# Patient Record
Sex: MALE | Race: BLACK OR AFRICAN AMERICAN | NOT HISPANIC OR LATINO | Employment: UNEMPLOYED | URBAN - METROPOLITAN AREA
[De-identification: names, ages, dates, MRNs, and addresses within clinical notes are randomized per-mention and may not be internally consistent; named-entity substitution may affect disease eponyms.]

---

## 2017-09-11 ENCOUNTER — APPOINTMENT (OUTPATIENT)
Dept: RADIOLOGY | Facility: MEDICAL CENTER | Age: 22
End: 2017-09-11
Attending: EMERGENCY MEDICINE
Payer: COMMERCIAL

## 2017-09-11 ENCOUNTER — HOSPITAL ENCOUNTER (EMERGENCY)
Facility: MEDICAL CENTER | Age: 22
End: 2017-09-11
Attending: EMERGENCY MEDICINE
Payer: COMMERCIAL

## 2017-09-11 VITALS
OXYGEN SATURATION: 97 % | HEART RATE: 76 BPM | TEMPERATURE: 98.7 F | DIASTOLIC BLOOD PRESSURE: 69 MMHG | SYSTOLIC BLOOD PRESSURE: 146 MMHG | WEIGHT: 189.6 LBS | RESPIRATION RATE: 14 BRPM | BODY MASS INDEX: 25.13 KG/M2 | HEIGHT: 73 IN

## 2017-09-11 DIAGNOSIS — R10.9 ACUTE ABDOMINAL PAIN: ICD-10-CM

## 2017-09-11 LAB
ALBUMIN SERPL BCP-MCNC: 5.2 G/DL (ref 3.2–4.9)
ALBUMIN/GLOB SERPL: 1.5 G/DL
ALP SERPL-CCNC: 92 U/L (ref 30–99)
ALT SERPL-CCNC: 22 U/L (ref 2–50)
ANION GAP SERPL CALC-SCNC: 13 MMOL/L (ref 0–11.9)
AST SERPL-CCNC: 24 U/L (ref 12–45)
BASOPHILS # BLD AUTO: 0.4 % (ref 0–1.8)
BASOPHILS # BLD: 0.05 K/UL (ref 0–0.12)
BILIRUB SERPL-MCNC: 1.4 MG/DL (ref 0.1–1.5)
BUN SERPL-MCNC: 15 MG/DL (ref 8–22)
CALCIUM SERPL-MCNC: 10.6 MG/DL (ref 8.5–10.5)
CHLORIDE SERPL-SCNC: 102 MMOL/L (ref 96–112)
CO2 SERPL-SCNC: 23 MMOL/L (ref 20–33)
CREAT SERPL-MCNC: 1.11 MG/DL (ref 0.5–1.4)
EOSINOPHIL # BLD AUTO: 0.03 K/UL (ref 0–0.51)
EOSINOPHIL NFR BLD: 0.2 % (ref 0–6.9)
ERYTHROCYTE [DISTWIDTH] IN BLOOD BY AUTOMATED COUNT: 38.2 FL (ref 35.9–50)
GFR SERPL CREATININE-BSD FRML MDRD: >60 ML/MIN/1.73 M 2
GLOBULIN SER CALC-MCNC: 3.4 G/DL (ref 1.9–3.5)
GLUCOSE SERPL-MCNC: 103 MG/DL (ref 65–99)
HCT VFR BLD AUTO: 46.8 % (ref 42–52)
HGB BLD-MCNC: 16.7 G/DL (ref 14–18)
IMM GRANULOCYTES # BLD AUTO: 0.02 K/UL (ref 0–0.11)
IMM GRANULOCYTES NFR BLD AUTO: 0.2 % (ref 0–0.9)
LIPASE SERPL-CCNC: <3 U/L (ref 11–82)
LYMPHOCYTES # BLD AUTO: 1.25 K/UL (ref 1–4.8)
LYMPHOCYTES NFR BLD: 10.4 % (ref 22–41)
MCH RBC QN AUTO: 30.9 PG (ref 27–33)
MCHC RBC AUTO-ENTMCNC: 35.7 G/DL (ref 33.7–35.3)
MCV RBC AUTO: 86.5 FL (ref 81.4–97.8)
MONOCYTES # BLD AUTO: 0.89 K/UL (ref 0–0.85)
MONOCYTES NFR BLD AUTO: 7.4 % (ref 0–13.4)
NEUTROPHILS # BLD AUTO: 9.82 K/UL (ref 1.82–7.42)
NEUTROPHILS NFR BLD: 81.4 % (ref 44–72)
NRBC # BLD AUTO: 0 K/UL
NRBC BLD AUTO-RTO: 0 /100 WBC
PLATELET # BLD AUTO: 161 K/UL (ref 164–446)
PMV BLD AUTO: 10.7 FL (ref 9–12.9)
POTASSIUM SERPL-SCNC: 4 MMOL/L (ref 3.6–5.5)
PROT SERPL-MCNC: 8.6 G/DL (ref 6–8.2)
RBC # BLD AUTO: 5.41 M/UL (ref 4.7–6.1)
SODIUM SERPL-SCNC: 138 MMOL/L (ref 135–145)
WBC # BLD AUTO: 12.1 K/UL (ref 4.8–10.8)

## 2017-09-11 PROCEDURE — 83690 ASSAY OF LIPASE: CPT

## 2017-09-11 PROCEDURE — 99284 EMERGENCY DEPT VISIT MOD MDM: CPT

## 2017-09-11 PROCEDURE — 80053 COMPREHEN METABOLIC PANEL: CPT

## 2017-09-11 PROCEDURE — 74177 CT ABD & PELVIS W/CONTRAST: CPT

## 2017-09-11 PROCEDURE — 81003 URINALYSIS AUTO W/O SCOPE: CPT

## 2017-09-11 PROCEDURE — 700117 HCHG RX CONTRAST REV CODE 255: Performed by: EMERGENCY MEDICINE

## 2017-09-11 PROCEDURE — 85025 COMPLETE CBC W/AUTO DIFF WBC: CPT

## 2017-09-11 PROCEDURE — 36415 COLL VENOUS BLD VENIPUNCTURE: CPT

## 2017-09-11 RX ADMIN — IOHEXOL 100 ML: 350 INJECTION, SOLUTION INTRAVENOUS at 21:40

## 2017-09-11 ASSESSMENT — LIFESTYLE VARIABLES: DO YOU DRINK ALCOHOL: NO

## 2017-09-12 LAB
APPEARANCE UR: CLEAR
BILIRUB UR QL STRIP.AUTO: NEGATIVE
COLOR UR: YELLOW
GLUCOSE UR STRIP.AUTO-MCNC: NEGATIVE MG/DL
KETONES UR STRIP.AUTO-MCNC: ABNORMAL MG/DL
LEUKOCYTE ESTERASE UR QL STRIP.AUTO: NEGATIVE
MICRO URNS: ABNORMAL
NITRITE UR QL STRIP.AUTO: NEGATIVE
PH UR STRIP.AUTO: 6 [PH]
PROT UR QL STRIP: NEGATIVE MG/DL
RBC UR QL AUTO: NEGATIVE
SP GR UR STRIP.AUTO: 1
UROBILINOGEN UR STRIP.AUTO-MCNC: NORMAL MG/DL

## 2017-09-12 NOTE — ED PROVIDER NOTES
ED Provider Note    HPI: Patient is a 22-year-old male who presented to the emergency department September 11, 2017 at 5:53 PM with a chief complaint of right-sided abdominal pain.    Patient was seen in urgent care today. They were concerned about a possible appendix. The patient was sent here for further care. Symptoms began earlier in the day. The patient has had pain in both lower quadrants of the abdomen somewhat greater on the right. He's had some nausea and vomiting but no diarrhea. By the time the patient was brought back to the department.  He felt quite a bit better. No fever no chills no cough. The pain was not positional in nature. No change in bladder or bowel habits. Nothing in particular seemed to make the pain better or worse.    Review of Systems: Positive for lower quadrant abdominal pain greater on the right that seems somewhat better than earlier positive for nausea vomiting negative for fever chills cough change in bladder or bowel habits. 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  Neck: Trachea midline. No cervical masses seen or palpated. Normal range of motion, supple. No meningeal signs elicited.  Cardiac: Regular rate and rhythm. S1-S2 present. No S3 or S4 present. No murmurs, rubs, or gallops heard. No edema or varicosities were seen.   Lungs: Clear to auscultation with good aeration throughout. No wheezes, rales, or rhonchi heard. Patient's chest wall moved symmetrically with each respiratory effort. Patient was not making use of accessory  muscles of respiration in breathing.  Abdomen: Soft nontender to palpation. No rebound or guarding elicited. No organomegaly identified. Subjective tenderness in the right lower quadrant that is not increased with palpation. No pulsatile abdominal masses identified.   Musculoskeletal:  no  pain with palpitation or movement of muscle, bone or joint , no obvious musculoskeletal deformities identified.  Neurologic: alert and awake answers questions appropriately. Moves all four extremities independently, no gross focal abnormalities identified. Normal strength and motor.  Skin: no rash or lesion seen, no palpable dermatologic lesions identified.  Psychiatric: not anxious, delusional, or hallucinating.    Medical decision making: Laboratory studies were obtained (please see labsheet follow results) significant findings included white count 12.1 with no preponderance immature granulocytes. Chemistry panel is essentially unremarkable.    CT imaging of the abdomen with IV contrast obtained. No acute abnormalities were seen appendix was not visualized    Recheck showed the patient with a soft abdomen. I can elicit no tenderness with palpation. Had a long discussion with the patient about his laboratory and imaging studies. I made it very clear to him as well as encouraging is CT showed no obvious evidence of acute appendicitis this has not been completely ruled out. The patient is sent home with abdominal pain instructions. He is to rest and push fluids. It is not pain-free by tomorrow afternoon he is to return to ED for repeat exam and possible imaging    Patient verbalized understanding of these instructions including the possibility of early cold appendicitis and states he will comply.    Other possibilities and differential diagnosis would be kidney stone although this seems unlikely giving quality of his presenting complaints, viral type syndrome enteritis. Diverticulitis seems unlikely in a patient of this age with  unremarkable CT imaging findings.    Impression acute abdominal pain cause unclear

## 2017-09-12 NOTE — DISCHARGE INSTRUCTIONS
Abdominal Pain (Nonspecific)  Your exam might not show the exact reason you have abdominal pain. Since there are many different causes of abdominal pain, another checkup and more tests may be needed. It is very important to follow up for lasting (persistent) or worsening symptoms. A possible cause of abdominal pain in any person who still has his or her appendix is acute appendicitis. Appendicitis is often hard to diagnose. Normal blood tests, urine tests, ultrasound, and CT scans do not completely rule out early appendicitis or other causes of abdominal pain. Sometimes, only the changes that happen over time will allow appendicitis and other causes of abdominal pain to be determined. Other potential problems that may require surgery may also take time to become more apparent. Because of this, it is important that you follow all of the instructions below.  HOME CARE INSTRUCTIONS   · Rest as much as possible.   · Do not eat solid food until your pain is gone.   · While adults or children have pain: A diet of water, weak decaffeinated tea, broth or bouillon, gelatin, oral rehydration solutions (ORS), frozen ice pops, or ice chips may be helpful.   · When pain is gone in adults or children: Start a light diet (dry toast, crackers, applesauce, or white rice). Increase the diet slowly as long as it does not bother you. Eat no dairy products (including cheese and eggs) and no spicy, fatty, fried, or high-fiber foods.   · Use no alcohol, caffeine, or cigarettes.   · Take your regular medicines unless your caregiver told you not to.   · Take any prescribed medicine as directed.   · Only take over-the-counter or prescription medicines for pain, discomfort, or fever as directed by your caregiver. Do not give aspirin to children.   If your caregiver has given you a follow-up appointment, it is very important to keep that appointment. Not keeping the appointment could result in a permanent injury and/or lasting (chronic) pain  and/or disability. If there is any problem keeping the appointment, you must call to reschedule.   SEEK IMMEDIATE MEDICAL CARE IF:   · Your pain is not gone in 24 hours.   · Your pain becomes worse, changes location, or feels different.   · You or your child has an oral temperature above 102° F (38.9° C), not controlled by medicine.   · Your baby is older than 3 months with a rectal temperature of 102° F (38.9° C) or higher.   · Your baby is 3 months old or younger with a rectal temperature of 100.4° F (38° C) or higher.   · You have shaking chills.   · You keep throwing up (vomiting) or cannot drink liquids.   · There is blood in your vomit or you see blood in your bowel movements.   · Your bowel movements become dark or black.   · You have frequent bowel movements.   · Your bowel movements stop (become blocked) or you cannot pass gas.   · You have bloody, frequent, or painful urination.   · You have yellow discoloration in the skin or whites of the eyes.   · Your stomach becomes bloated or bigger.   · You have dizziness or fainting.   · You have chest or back pain.   MAKE SURE YOU:   · Understand these instructions.   · Will watch your condition.   · Will get help right away if you are not doing well or get worse.   Document Released: 12/18/2006 Document Revised: 03/11/2013 Document Reviewed: 11/15/2010  [employer]® Patient Information ©2013 [employer].    Abdominal Pain, Adult  Many things can cause abdominal pain. Usually, abdominal pain is not caused by a disease and will improve without treatment. It can often be observed and treated at home. Your health care provider will do a physical exam and possibly order blood tests and X-rays to help determine the seriousness of your pain. However, in many cases, more time must pass before a clear cause of the pain can be found. Before that point, your health care provider may not know if you need more testing or further treatment.  HOME CARE INSTRUCTIONS   Monitor  your abdominal pain for any changes. The following actions may help to alleviate any discomfort you are experiencing:  · Only take over-the-counter or prescription medicines as directed by your health care provider.  · Do not take laxatives unless directed to do so by your health care provider.  · Try a clear liquid diet (broth, tea, or water) as directed by your health care provider. Slowly move to a bland diet as tolerated.  SEEK MEDICAL CARE IF:  · You have unexplained abdominal pain.  · You have abdominal pain associated with nausea or diarrhea.  · You have pain when you urinate or have a bowel movement.  · You experience abdominal pain that wakes you in the night.  · You have abdominal pain that is worsened or improved by eating food.  · You have abdominal pain that is worsened with eating fatty foods.  · You have a fever.  SEEK IMMEDIATE MEDICAL CARE IF:   · Your pain does not go away within 2 hours.  · You keep throwing up (vomiting).  · Your pain is felt only in portions of the abdomen, such as the right side or the left lower portion of the abdomen.  · You pass bloody or black tarry stools.  MAKE SURE YOU:  · Understand these instructions.    · Will watch your condition.    · Will get help right away if you are not doing well or get worse.       This information is not intended to replace advice given to you by your health care provider. Make sure you discuss any questions you have with your health care provider.     Document Released: 09/27/2006 Document Revised: 01/08/2016 Document Reviewed: 08/27/2014  [email or web address] Interactive Patient Education ©2016 [email or web address]

## 2017-09-12 NOTE — ED NOTES
Pt discharged home. Assessment complete. Pt ambulates self. VS stable. Pt verbalized discharge instructions, questions answered at this time.

## 2017-09-12 NOTE — ED NOTES
"Chief Complaint   Patient presents with   • RLQ Pain   • N/V   • Abdominal Pain   • Sent by MD     Sent by doctor for possible appendicitis. Pt appears uncomfortable, hunched over in triage.       /69   Pulse 60   Temp 37.1 °C (98.7 °F) (Temporal)   Resp 18   Ht 1.854 m (6' 1\")   Wt 86 kg (189 lb 9.5 oz)   SpO2 98%   BMI 25.01 kg/m²       Pt Informed regarding triage process and verbalized understanding to inform triage tech or RN for any changes in condition.  Placed in lobby.    "

## 2018-10-17 ENCOUNTER — APPOINTMENT (OUTPATIENT)
Dept: RADIOLOGY | Facility: MEDICAL CENTER | Age: 23
End: 2018-10-17
Attending: EMERGENCY MEDICINE
Payer: COMMERCIAL

## 2018-10-17 ENCOUNTER — HOSPITAL ENCOUNTER (EMERGENCY)
Facility: MEDICAL CENTER | Age: 23
End: 2018-10-17
Attending: EMERGENCY MEDICINE | Admitting: SURGERY
Payer: COMMERCIAL

## 2018-10-17 VITALS
OXYGEN SATURATION: 99 % | SYSTOLIC BLOOD PRESSURE: 157 MMHG | HEIGHT: 73 IN | RESPIRATION RATE: 16 BRPM | BODY MASS INDEX: 25.68 KG/M2 | HEART RATE: 72 BPM | TEMPERATURE: 97.7 F | WEIGHT: 193.78 LBS | DIASTOLIC BLOOD PRESSURE: 83 MMHG

## 2018-10-17 DIAGNOSIS — E86.0 DEHYDRATION: ICD-10-CM

## 2018-10-17 DIAGNOSIS — K35.30 ACUTE APPENDICITIS WITH LOCALIZED PERITONITIS, WITHOUT PERFORATION, ABSCESS, OR GANGRENE: ICD-10-CM

## 2018-10-17 DIAGNOSIS — R11.2 NON-INTRACTABLE VOMITING WITH NAUSEA, UNSPECIFIED VOMITING TYPE: ICD-10-CM

## 2018-10-17 LAB
ALBUMIN SERPL BCP-MCNC: 4.7 G/DL (ref 3.2–4.9)
ALBUMIN/GLOB SERPL: 1.3 G/DL
ALP SERPL-CCNC: 88 U/L (ref 30–99)
ALT SERPL-CCNC: 28 U/L (ref 2–50)
ANION GAP SERPL CALC-SCNC: 11 MMOL/L (ref 0–11.9)
AST SERPL-CCNC: 29 U/L (ref 12–45)
BASOPHILS # BLD AUTO: 0.3 % (ref 0–1.8)
BASOPHILS # BLD: 0.03 K/UL (ref 0–0.12)
BILIRUB SERPL-MCNC: 1.1 MG/DL (ref 0.1–1.5)
BUN SERPL-MCNC: 15 MG/DL (ref 8–22)
CALCIUM SERPL-MCNC: 10 MG/DL (ref 8.5–10.5)
CHLORIDE SERPL-SCNC: 104 MMOL/L (ref 96–112)
CO2 SERPL-SCNC: 25 MMOL/L (ref 20–33)
CREAT SERPL-MCNC: 1.08 MG/DL (ref 0.5–1.4)
EOSINOPHIL # BLD AUTO: 0.02 K/UL (ref 0–0.51)
EOSINOPHIL NFR BLD: 0.2 % (ref 0–6.9)
ERYTHROCYTE [DISTWIDTH] IN BLOOD BY AUTOMATED COUNT: 37.8 FL (ref 35.9–50)
GLOBULIN SER CALC-MCNC: 3.7 G/DL (ref 1.9–3.5)
GLUCOSE SERPL-MCNC: 90 MG/DL (ref 65–99)
HCT VFR BLD AUTO: 46.7 % (ref 42–52)
HGB BLD-MCNC: 16.3 G/DL (ref 14–18)
IMM GRANULOCYTES # BLD AUTO: 0.03 K/UL (ref 0–0.11)
IMM GRANULOCYTES NFR BLD AUTO: 0.3 % (ref 0–0.9)
LIPASE SERPL-CCNC: <3 U/L (ref 11–82)
LYMPHOCYTES # BLD AUTO: 1.11 K/UL (ref 1–4.8)
LYMPHOCYTES NFR BLD: 9.6 % (ref 22–41)
MCH RBC QN AUTO: 31 PG (ref 27–33)
MCHC RBC AUTO-ENTMCNC: 34.9 G/DL (ref 33.7–35.3)
MCV RBC AUTO: 88.8 FL (ref 81.4–97.8)
MONOCYTES # BLD AUTO: 0.84 K/UL (ref 0–0.85)
MONOCYTES NFR BLD AUTO: 7.3 % (ref 0–13.4)
NEUTROPHILS # BLD AUTO: 9.54 K/UL (ref 1.82–7.42)
NEUTROPHILS NFR BLD: 82.3 % (ref 44–72)
NRBC # BLD AUTO: 0 K/UL
NRBC BLD-RTO: 0 /100 WBC
PLATELET # BLD AUTO: 221 K/UL (ref 164–446)
PMV BLD AUTO: 10.3 FL (ref 9–12.9)
POTASSIUM SERPL-SCNC: 4.2 MMOL/L (ref 3.6–5.5)
PROT SERPL-MCNC: 8.4 G/DL (ref 6–8.2)
RBC # BLD AUTO: 5.26 M/UL (ref 4.7–6.1)
SODIUM SERPL-SCNC: 140 MMOL/L (ref 135–145)
WBC # BLD AUTO: 11.6 K/UL (ref 4.8–10.8)

## 2018-10-17 PROCEDURE — 160025 RECOVERY II MINUTES (STATS): Mod: EDC | Performed by: SURGERY

## 2018-10-17 PROCEDURE — 74177 CT ABD & PELVIS W/CONTRAST: CPT

## 2018-10-17 PROCEDURE — 160035 HCHG PACU - 1ST 60 MINS PHASE I: Mod: EDC | Performed by: SURGERY

## 2018-10-17 PROCEDURE — 36415 COLL VENOUS BLD VENIPUNCTURE: CPT | Mod: EDC

## 2018-10-17 PROCEDURE — 160009 HCHG ANES TIME/MIN: Mod: EDC | Performed by: SURGERY

## 2018-10-17 PROCEDURE — 160002 HCHG RECOVERY MINUTES (STAT): Mod: EDC | Performed by: SURGERY

## 2018-10-17 PROCEDURE — 500868 HCHG NEEDLE, SURGI(VARES): Mod: EDC | Performed by: SURGERY

## 2018-10-17 PROCEDURE — 700102 HCHG RX REV CODE 250 W/ 637 OVERRIDE(OP): Mod: EDC

## 2018-10-17 PROCEDURE — 160046 HCHG PACU - 1ST 60 MINS PHASE II: Mod: EDC | Performed by: SURGERY

## 2018-10-17 PROCEDURE — 700101 HCHG RX REV CODE 250: Mod: EDC

## 2018-10-17 PROCEDURE — 83690 ASSAY OF LIPASE: CPT | Mod: EDC

## 2018-10-17 PROCEDURE — 160048 HCHG OR STATISTICAL LEVEL 1-5: Mod: EDC | Performed by: SURGERY

## 2018-10-17 PROCEDURE — 85025 COMPLETE CBC W/AUTO DIFF WBC: CPT | Mod: EDC

## 2018-10-17 PROCEDURE — A6402 STERILE GAUZE <= 16 SQ IN: HCPCS | Mod: EDC | Performed by: SURGERY

## 2018-10-17 PROCEDURE — 99291 CRITICAL CARE FIRST HOUR: CPT | Mod: EDC

## 2018-10-17 PROCEDURE — 160028 HCHG SURGERY MINUTES - 1ST 30 MINS LEVEL 3: Mod: EDC | Performed by: SURGERY

## 2018-10-17 PROCEDURE — 700111 HCHG RX REV CODE 636 W/ 250 OVERRIDE (IP): Mod: EDC | Performed by: EMERGENCY MEDICINE

## 2018-10-17 PROCEDURE — 80053 COMPREHEN METABOLIC PANEL: CPT | Mod: EDC

## 2018-10-17 PROCEDURE — 501450 HCHG STAPLES, ENDO MULTIFIRE: Mod: EDC | Performed by: SURGERY

## 2018-10-17 PROCEDURE — 700105 HCHG RX REV CODE 258: Mod: EDC | Performed by: EMERGENCY MEDICINE

## 2018-10-17 PROCEDURE — 700117 HCHG RX CONTRAST REV CODE 255: Mod: EDC | Performed by: EMERGENCY MEDICINE

## 2018-10-17 PROCEDURE — 501838 HCHG SUTURE GENERAL: Mod: EDC | Performed by: SURGERY

## 2018-10-17 PROCEDURE — 501583 HCHG TROCAR, THRD CAN&SEAL 5X100: Mod: EDC | Performed by: SURGERY

## 2018-10-17 PROCEDURE — 96375 TX/PRO/DX INJ NEW DRUG ADDON: CPT | Mod: EDC

## 2018-10-17 PROCEDURE — 88304 TISSUE EXAM BY PATHOLOGIST: CPT | Mod: EDC

## 2018-10-17 PROCEDURE — 502571 HCHG PACK, LAP CHOLE: Mod: EDC | Performed by: SURGERY

## 2018-10-17 PROCEDURE — 700111 HCHG RX REV CODE 636 W/ 250 OVERRIDE (IP): Mod: EDC

## 2018-10-17 PROCEDURE — 501582 HCHG TROCAR, THRD BLADED: Mod: EDC | Performed by: SURGERY

## 2018-10-17 PROCEDURE — 96365 THER/PROPH/DIAG IV INF INIT: CPT | Mod: EDC

## 2018-10-17 PROCEDURE — 501572 HCHG TROCAR, SHIELD OBTU 5X100: Mod: EDC | Performed by: SURGERY

## 2018-10-17 PROCEDURE — 160039 HCHG SURGERY MINUTES - EA ADDL 1 MIN LEVEL 3: Mod: EDC | Performed by: SURGERY

## 2018-10-17 PROCEDURE — A9270 NON-COVERED ITEM OR SERVICE: HCPCS | Mod: EDC

## 2018-10-17 RX ORDER — ONDANSETRON 2 MG/ML
4 INJECTION INTRAMUSCULAR; INTRAVENOUS
Status: DISCONTINUED | OUTPATIENT
Start: 2018-10-17 | End: 2018-10-17 | Stop reason: HOSPADM

## 2018-10-17 RX ORDER — OXYCODONE HCL 5 MG/5 ML
SOLUTION, ORAL ORAL
Status: COMPLETED
Start: 2018-10-17 | End: 2018-10-17

## 2018-10-17 RX ORDER — ONDANSETRON 2 MG/ML
4 INJECTION INTRAMUSCULAR; INTRAVENOUS ONCE
Status: COMPLETED | OUTPATIENT
Start: 2018-10-17 | End: 2018-10-17

## 2018-10-17 RX ORDER — HYDROMORPHONE HYDROCHLORIDE 2 MG/ML
0.2 INJECTION, SOLUTION INTRAMUSCULAR; INTRAVENOUS; SUBCUTANEOUS
Status: DISCONTINUED | OUTPATIENT
Start: 2018-10-17 | End: 2018-10-17 | Stop reason: HOSPADM

## 2018-10-17 RX ORDER — MAGNESIUM HYDROXIDE 1200 MG/15ML
LIQUID ORAL
Status: COMPLETED | OUTPATIENT
Start: 2018-10-17 | End: 2018-10-17

## 2018-10-17 RX ORDER — SODIUM CHLORIDE 9 MG/ML
1000 INJECTION, SOLUTION INTRAVENOUS ONCE
Status: COMPLETED | OUTPATIENT
Start: 2018-10-17 | End: 2018-10-17

## 2018-10-17 RX ORDER — SODIUM CHLORIDE, SODIUM LACTATE, POTASSIUM CHLORIDE, CALCIUM CHLORIDE 600; 310; 30; 20 MG/100ML; MG/100ML; MG/100ML; MG/100ML
INJECTION, SOLUTION INTRAVENOUS CONTINUOUS
Status: DISCONTINUED | OUTPATIENT
Start: 2018-10-17 | End: 2018-10-17 | Stop reason: HOSPADM

## 2018-10-17 RX ORDER — MIDAZOLAM HYDROCHLORIDE 1 MG/ML
1 INJECTION INTRAMUSCULAR; INTRAVENOUS
Status: DISCONTINUED | OUTPATIENT
Start: 2018-10-17 | End: 2018-10-17 | Stop reason: HOSPADM

## 2018-10-17 RX ORDER — HALOPERIDOL 5 MG/ML
1 INJECTION INTRAMUSCULAR
Status: DISCONTINUED | OUTPATIENT
Start: 2018-10-17 | End: 2018-10-17 | Stop reason: HOSPADM

## 2018-10-17 RX ORDER — OXYCODONE HYDROCHLORIDE 5 MG/1
5 TABLET ORAL
Status: DISCONTINUED | OUTPATIENT
Start: 2018-10-17 | End: 2018-10-17 | Stop reason: HOSPADM

## 2018-10-17 RX ORDER — OXYCODONE HCL 5 MG/5 ML
5 SOLUTION, ORAL ORAL
Status: COMPLETED | OUTPATIENT
Start: 2018-10-17 | End: 2018-10-17

## 2018-10-17 RX ORDER — BUPIVACAINE HYDROCHLORIDE 2.5 MG/ML
INJECTION, SOLUTION EPIDURAL; INFILTRATION; INTRACAUDAL
Status: DISCONTINUED | OUTPATIENT
Start: 2018-10-17 | End: 2018-10-17 | Stop reason: HOSPADM

## 2018-10-17 RX ORDER — SODIUM CHLORIDE, SODIUM LACTATE, POTASSIUM CHLORIDE, CALCIUM CHLORIDE 600; 310; 30; 20 MG/100ML; MG/100ML; MG/100ML; MG/100ML
INJECTION, SOLUTION INTRAVENOUS
Status: COMPLETED | OUTPATIENT
Start: 2018-10-17 | End: 2018-10-17

## 2018-10-17 RX ORDER — MEPERIDINE HYDROCHLORIDE 25 MG/ML
12.5 INJECTION INTRAMUSCULAR; INTRAVENOUS; SUBCUTANEOUS
Status: DISCONTINUED | OUTPATIENT
Start: 2018-10-17 | End: 2018-10-17 | Stop reason: HOSPADM

## 2018-10-17 RX ORDER — OXYCODONE HCL 5 MG/5 ML
10 SOLUTION, ORAL ORAL
Status: COMPLETED | OUTPATIENT
Start: 2018-10-17 | End: 2018-10-17

## 2018-10-17 RX ORDER — HYDROCODONE BITARTRATE AND ACETAMINOPHEN 5; 325 MG/1; MG/1
1-2 TABLET ORAL EVERY 6 HOURS PRN
Status: DISCONTINUED | OUTPATIENT
Start: 2018-10-17 | End: 2018-10-17 | Stop reason: HOSPADM

## 2018-10-17 RX ORDER — MORPHINE SULFATE 10 MG/ML
6 INJECTION, SOLUTION INTRAMUSCULAR; INTRAVENOUS ONCE
Status: COMPLETED | OUTPATIENT
Start: 2018-10-17 | End: 2018-10-17

## 2018-10-17 RX ORDER — HYDROMORPHONE HYDROCHLORIDE 2 MG/ML
0.1 INJECTION, SOLUTION INTRAMUSCULAR; INTRAVENOUS; SUBCUTANEOUS
Status: DISCONTINUED | OUTPATIENT
Start: 2018-10-17 | End: 2018-10-17 | Stop reason: HOSPADM

## 2018-10-17 RX ORDER — OXYCODONE HYDROCHLORIDE 10 MG/1
10 TABLET ORAL
Status: DISCONTINUED | OUTPATIENT
Start: 2018-10-17 | End: 2018-10-17 | Stop reason: HOSPADM

## 2018-10-17 RX ORDER — HYDROMORPHONE HYDROCHLORIDE 2 MG/ML
0.4 INJECTION, SOLUTION INTRAMUSCULAR; INTRAVENOUS; SUBCUTANEOUS
Status: DISCONTINUED | OUTPATIENT
Start: 2018-10-17 | End: 2018-10-17 | Stop reason: HOSPADM

## 2018-10-17 RX ADMIN — Medication 10 MG: at 20:13

## 2018-10-17 RX ADMIN — OXYCODONE HYDROCHLORIDE 10 MG: 5 SOLUTION ORAL at 20:13

## 2018-10-17 RX ADMIN — FENTANYL CITRATE 25 MCG: 50 INJECTION, SOLUTION INTRAMUSCULAR; INTRAVENOUS at 20:26

## 2018-10-17 RX ADMIN — MORPHINE SULFATE 6 MG: 10 INJECTION INTRAVENOUS at 17:46

## 2018-10-17 RX ADMIN — FAMOTIDINE 20 MG: 10 INJECTION INTRAVENOUS at 17:46

## 2018-10-17 RX ADMIN — CEFTRIAXONE SODIUM 2 G: 2 INJECTION, POWDER, FOR SOLUTION INTRAMUSCULAR; INTRAVENOUS at 18:46

## 2018-10-17 RX ADMIN — SODIUM CHLORIDE 1000 ML: 9 INJECTION, SOLUTION INTRAVENOUS at 17:44

## 2018-10-17 RX ADMIN — IOHEXOL 100 ML: 350 INJECTION, SOLUTION INTRAVENOUS at 18:04

## 2018-10-17 RX ADMIN — ONDANSETRON 4 MG: 2 INJECTION INTRAMUSCULAR; INTRAVENOUS at 17:43

## 2018-10-17 ASSESSMENT — PAIN SCALES - GENERAL
PAINLEVEL_OUTOF10: 7
PAINLEVEL_OUTOF10: 8
PAINLEVEL_OUTOF10: 3
PAINLEVEL_OUTOF10: 0
PAINLEVEL_OUTOF10: 3
PAINLEVEL_OUTOF10: 6
PAINLEVEL_OUTOF10: 3
PAINLEVEL_OUTOF10: 3

## 2018-10-17 NOTE — ED TRIAGE NOTES
"Chief Complaint   Patient presents with   • Abdominal Pain     mid abdomen onset this morning   • N/V     Pt ambulated to triage, c/o mid abdominal pain  \"all over\" with n/v started earlier.   Protocol ordered  "

## 2018-10-17 NOTE — ED PROVIDER NOTES
"ED PROVIDER NOTE     Scribed for Chele Cardenas M.D. by Paolo Beltran. 10/17/2018, 4:56 PM.    CHIEF COMPLAINT  Chief Complaint   Patient presents with   • Abdominal Pain     mid abdomen onset this morning   • N/V     HPI    Primary care provider: None  Means of arrival: Walk-in  History obtained from: Patient,   History limited by: None    Silas Vines V is a 23 y.o. male who presents with complaints of abdominal pain this morning. The patient explains that this morning after he woke up he had sudden onset diffuse/periumbilical abdominal pain which has been more localized to his periumbilical region. Patient states that throughout the day today his pain has been constant but since the onset his pain has slightly improved. He describes this pain as \"sharp\" and he denies any exacerbating or alleviating factors. The patient also has had multiple episodes of vomiting today and notes that he has slight chills following each episode of vomiting. He further attributes his pain to possible constipation but does note that he had a regular bowel movement this morning.  No alleviating measures attempted.  No noted aggravating factors.  The patient was in the ED last year as well for the same complaint and had a CT scan which did not reveal any remarkable findings. He does not take any medications on a regular basis and denies any other recent new or different foods. He also does not believe that he has been in contact with other sick individuals. The patient explains that he did have [employer] fried chicken yesterday and has only had water throughout the day today with no food since yesterday. Patient denies any chest pain, hematemesis, fevers, sore throat, difficulty breathing, dysuria, or diarrhea.    REVIEW OF SYSTEMS  Constitutional: Negative for fever and chills following vomiting.   HENT: Negative sore throat or rhinorrhea.    Respiratory: Negative for shortness of breath or cough.  " "  Cardiovascular: Negative for chest pain or syncope.  Gastrointestinal: Positive for nausea, vomiting, abdominal pain. Negative for diarrhea or hematemesis.   Genitourinary: Negative for dysuria or genitourinary pain or swelling.  All the systems reviewed and are negative.    PAST MEDICAL HISTORY   has a past medical history of Asthma.    PAST FAMILY HISTORY  History reviewed. No pertinent family history.    SOCIAL HISTORY  Social History     Social History Main Topics   • Smoking status: Never Smoker   • Smokeless tobacco: Never Used   • Alcohol use No   • Drug use: No   • Sexual activity: None noted     SURGICAL HISTORY   has a past surgical history that includes appendectomy laparoscopic (10/17/2018).    CURRENT MEDICATIONS  None    ALLERGIES  Allergies   Allergen Reactions   • Shellfish Allergy      PHYSICAL EXAM  VITAL SIGNS: /69   Pulse (!) 52   Temp 36.4 °C (97.6 °F)   Resp 14   Ht 1.854 m (6' 1\")   Wt 87.9 kg (193 lb 12.6 oz)   SpO2 93%   BMI 25.57 kg/m²    Pulse ox interpretation: On room air, I interpret this pulse ox as abnormal.  Constitutional: Well developed, well nourished. Laying on the stretcher. Looks tired and slightly uncomfortable appearing.   HEENT: Normocephalic, atraumatic. Posterior pharynx clear, dry mucous membranes  Eyes:  EOMI. Normal sclera.  Neck: Supple, Full range of motion, nontender.  Chest/Pulmonary: Clear to ausculation bilaterally, no wheezes or rhonchi.  Cardiovascular: Regular rate and rhythm, no murmur.   Abdomen: Soft, no rebound or masses. Mild diffuse abdominal pain with some guarding over the right lower quadrant/McBurney's point.  : Negative for tenderness or swelling.  Back: No CVA tenderness, nontender midline, no step offs.  Musculoskeletal: No deformity, no edema.  Neuro: Clear speech, normal coordination, cranial nerves II-XII grossly intact.  Psych: Normal mood and affect.  Skin: No rashes, warm and dry.    DIAGNOSTIC STUDIES / PROCEDURES    LABS & " EKG  Results for orders placed or performed during the hospital encounter of 10/17/18   CBC WITH DIFFERENTIAL   Result Value Ref Range    WBC 11.6 (H) 4.8 - 10.8 K/uL    RBC 5.26 4.70 - 6.10 M/uL    Hemoglobin 16.3 14.0 - 18.0 g/dL    Hematocrit 46.7 42.0 - 52.0 %    MCV 88.8 81.4 - 97.8 fL    MCH 31.0 27.0 - 33.0 pg    MCHC 34.9 33.7 - 35.3 g/dL    RDW 37.8 35.9 - 50.0 fL    Platelet Count 221 164 - 446 K/uL    MPV 10.3 9.0 - 12.9 fL    Neutrophils-Polys 82.30 (H) 44.00 - 72.00 %    Lymphocytes 9.60 (L) 22.00 - 41.00 %    Monocytes 7.30 0.00 - 13.40 %    Eosinophils 0.20 0.00 - 6.90 %    Basophils 0.30 0.00 - 1.80 %    Immature Granulocytes 0.30 0.00 - 0.90 %    Nucleated RBC 0.00 /100 WBC    Neutrophils (Absolute) 9.54 (H) 1.82 - 7.42 K/uL    Lymphs (Absolute) 1.11 1.00 - 4.80 K/uL    Monos (Absolute) 0.84 0.00 - 0.85 K/uL    Eos (Absolute) 0.02 0.00 - 0.51 K/uL    Baso (Absolute) 0.03 0.00 - 0.12 K/uL    Immature Granulocytes (abs) 0.03 0.00 - 0.11 K/uL    NRBC (Absolute) 0.00 K/uL   COMP METABOLIC PANEL   Result Value Ref Range    Sodium 140 135 - 145 mmol/L    Potassium 4.2 3.6 - 5.5 mmol/L    Chloride 104 96 - 112 mmol/L    Co2 25 20 - 33 mmol/L    Anion Gap 11.0 0.0 - 11.9    Glucose 90 65 - 99 mg/dL    Bun 15 8 - 22 mg/dL    Creatinine 1.08 0.50 - 1.40 mg/dL    Calcium 10.0 8.5 - 10.5 mg/dL    AST(SGOT) 29 12 - 45 U/L    ALT(SGPT) 28 2 - 50 U/L    Alkaline Phosphatase 88 30 - 99 U/L    Total Bilirubin 1.1 0.1 - 1.5 mg/dL    Albumin 4.7 3.2 - 4.9 g/dL    Total Protein 8.4 (H) 6.0 - 8.2 g/dL    Globulin 3.7 (H) 1.9 - 3.5 g/dL    A-G Ratio 1.3 g/dL   LIPASE   Result Value Ref Range    Lipase <3 (L) 11 - 82 U/L   ESTIMATED GFR   Result Value Ref Range    GFR If African American >60 >60 mL/min/1.73 m 2    GFR If Non African American >60 >60 mL/min/1.73 m 2     RADIOLOGY  CT-ABDOMEN-PELVIS WITH   Final Result      1.  Early appendicitis.      2.  No evidence of bowel obstruction.        COURSE & MEDICAL DECISION  MAKING    This is a 23 y.o. male who presents with periumbilical/diffuse abdominal pain with 3 episodes of nonbloody emesis today.  Afebrile, stable vital signs.    Differential Diagnosis includes but is not limited to:  Appendicitis, gastritis, hepatobiliary disease, obstruction, viral syndrome    ED Course:  Given the patient's vague periumbilical complaints of pain but focal right lower quadrant tenderness on examination, as well as emesis today without diarrhea I am concerned for an acute appendicitis.  He is otherwise healthy with no past medical history, thus I will plan to rule out appendicitis with a CT of his abdomen and pelvis, screen him with lab work, treat his nausea with ondansetron IV, keep him n.p.o., and given he has slightly dry mucous membranes and I am concerned for dehydration and will give him 1 L of IV crystalloid fluids.     5:40 PM - Patient seen and examined at bedside. Discussed plan of care, including ordering a CT scan as well as basic blood work. Also informed the patient that he will be given medication for symptomatic relief and fluids for dehydration. Patient agrees to the plan of care. The patient will be resuscitated with 1L NS IV secondary to dehydration and medicated with 20 mg Pepcid injection, 4 mg Zofran injection, 6 mg morphine injection. Ordered for CT-abdomen pelvis with contrast, CBC with differential, CMP, lipase, urinalysis to evaluate his symptoms.     Elevated white blood cell count to 11, CT scan concerning for early appendicitis without obvious perforation.  Ceftriaxone and Flagyl IV ordered.    6:18 PM - Patient was reevaluated at bedside. Discussed lab and radiology results with the patient and informed them that the CT scan shows signs of an early appendicits. Explained that he will be started on IV antibiotics and our on-call general surgeon will be called for surgical removal of his appendix. The patient understands and verbalizes agreement with the plan of  care.     6:20 PM Paged general surgery.     6:26 PM I discussed the patient's case and the above findings with Dr. Lee (general surgeon) who kindly agrees to admit the patient and plans for operative intervention tonight.    6:39 PM I discussed the patient's case and the above findings with patient's father over the phone.  He understands, agrees with, and appreciates the plan of care.     The patient is hemodynamically stable for transfer to [facility] in guarded condition.  His vitals remained stable and his symptoms are improving thus I feel he is having a positive response to IV fluid rehydration.    Medications   NS infusion 1,000 mL (1,000 mL Intravenous New Bag 10/17/18 1744)   ondansetron (ZOFRAN) syringe/vial injection 4 mg (4 mg Intravenous Given 10/17/18 1743)   famotidine (PEPCID) injection 20 mg (20 mg Intravenous Given 10/17/18 1746)   morphine (pf) 10 mg/ml 10 MG/ML injection 6 mg (6 mg Intravenous Given 10/17/18 1746)   iohexol (OMNIPAQUE) 350 mg/mL (100 mL Intravenous Given 10/17/18 1804)   cefTRIAXone (ROCEPHIN) 2 g in  mL IVPB (2 g Intravenous New Bag 10/17/18 1846)   oxyCODONE (ROXICODONE) oral solution 5 mg ( Oral See Alternative 10/17/18 2013)     Or   oxyCODONE (ROXICODONE) oral solution 10 mg (10 mg Oral Given 10/17/18 2013)   lactated ringers infusion/irrigation (1,000 mL Other New Bag 10/17/18 1942)   sodium chloride for irrigation 0.9 % irrigant (1,000 mL Irrigation New Bag 10/17/18 1942)     FINAL IMPRESSION  1. Acute appendicitis with localized peritonitis, without perforation, abscess, or gangrene    2. Dehydration    3. Non-intractable vomiting with nausea, unspecified vomiting type      -ADMIT-     Pertinent Labs & Imaging studies reviewed and verified by myself, as well as nursing notes and the patient's past medical, family, and social histories (See chart for details).    Results, exam findings, clinical impression, presumed diagnosis, treatment options, and plan for admission  were discussed with the patient, as well as his father by phone, and they verbalized understanding, agreed with, and appreciated the plan of care.     I, Paolo Beltran (Scribe), am scribing for, and in the presence of, Chele Cardenas M.D..    Electronically signed by: Paolo Beltran (Scribe), 10/17/2018    IChele M.D. personally performed the services described in this documentation, as scribed by Paolo Beltran in my presence, and it is both accurate and complete. C.     Portions of this record were made with voice recognition software.  Despite my review, spelling/grammar/context errors may still remain.  Interpretation of this chart should be taken in this context.    The note accurately reflects work and decisions made by me.  Chele Cardenas  10/18/2018  1:37 AM

## 2018-10-18 LAB — PATHOLOGY CONSULT NOTE: NORMAL

## 2018-10-18 NOTE — OP REPORT
DATE OF SERVICE:  10/17/2018    PREOPERATIVE DIAGNOSIS:  Acute appendicitis.    POSTOPERATIVE DIAGNOSIS:  Acute appendicitis.    PROCEDURE PERFORMED:  Laparoscopic appendectomy.    SURGEON:  Racquel Lee MD    ASSISTANT:  ISAI Jules    ANESTHESIA:  General endotracheal.    ANESTHESIOLOGIST:  Tobey B. Gansert, MD    INDICATIONS:  The patient is a 23-year-old male who presents with a 1-day   history of periumbilical and right lower quadrant pain.  Workup found him to   have evidence of acute appendicitis.  He is being brought at this time for a   laparoscopic appendectomy.    FINDINGS:  Acute appendicitis without perforation.    DESCRIPTION OF PROCEDURE:  Patient identified and consented.  He was brought   to the operating room and placed in the supine position.  Patient underwent   general endotracheal anesthetic clearance.  Patient's abdomen was prepped and   draped in sterile fashion.  The periumbilical area was anesthetized with 0.25%   Marcaine and a 1 cm incision was made.  The abdominal wall was lifted up and   a Veress needle was inserted into the abdominal cavity.  After positive drop   test, pneumoperitoneum was obtained.  The Veress needle was removed.  A 5-mm   trocar was placed.  Under laparoscopic guidance, a 5-mm trocar was placed in   the right subcostal position and a 12-mm trocar was placed in the suprapubic   position.  The appendix was elevated and amputated with an Endo-PERLA stapler,   placed in an EndoCatch, brought through the suprapubic port.  Appendiceal bed   was irrigated and hemostasis secured.  Port sites were removed.  Port sites   were closed with interrupted 4-0 Vicryls.  Op-Site dressing was placed on the   wounds.  Patient was extubated and taken to recovery in stable condition.  All   sponge and needle counts were correct.       ____________________________________     RACQUEL LEE MD    [facility] / [facility]    DD:  10/17/2018 19:54:47  DT:  10/17/2018 20:30:06    D#:  [id number]   Job#:  [id number]    cc: Tobey Gansert MD

## 2018-10-18 NOTE — ED NOTES
Pt ambulatory to Peds [facility]. Agree with triage RN note. Instructed to change into gown. Placed on monitors. Pt alert, pink, interactive and in NAD. Reports mid abd pain starting this morning. Vomiting starting at 1300. Denies diarrhea or dysuria. Call light within reach. Denies additional needs.

## 2018-10-18 NOTE — DISCHARGE INSTRUCTIONS
ACTIVITY: Rest and take it easy for the first 24 hours.  A responsible adult is recommended to remain with you during that time.  It is normal to feel sleepy.  We encourage you to not do anything that requires balance, judgment or coordination.    MILD FLU-LIKE SYMPTOMS ARE NORMAL. YOU MAY EXPERIENCE GENERALIZED MUSCLE ACHES, THROAT IRRITATION, HEADACHE AND/OR SOME NAUSEA.    FOR 24 HOURS DO NOT:  Drive, operate machinery or run household appliances.  Drink beer or alcoholic beverages.   Make important decisions or sign legal documents.    SPECIAL INSTRUCTIONS:     Laparoscopic Appendectomy, Adult, Care After  Refer to this sheet in the next few weeks. These instructions provide you with information on caring for yourself after your procedure. Your caregiver may also give you more specific instructions. Your treatment has been planned according to current medical practices, but problems sometimes occur. Call your caregiver if you have any problems or questions after your procedure.  HOME CARE INSTRUCTIONS  · Do not drive while taking narcotic pain medicines.  · Use stool softener if you become constipated from your pain medicines.  · Change your bandages (dressings) as directed.  · Keep your wounds clean and dry. You may wash the wounds gently with soap and water. Gently pat the wounds dry with a clean towel.  · Do not take baths, swim, or use hot tubs for 10 days, or as instructed by your caregiver.  · Only take over-the-counter or prescription medicines for pain, discomfort, or fever as directed by your caregiver.  · You may continue your normal diet as directed.  · Do not lift more than 10 pounds (4.5 kg) or play contact sports for 3 weeks, or as directed.  · Slowly increase your activity after surgery.  · Take deep breaths to avoid getting a lung infection (pneumonia).  SEEK MEDICAL CARE IF:  · You have redness, swelling, or increasing pain in your wounds.  · You have pus coming from your wounds.  · You have  drainage from a wound that lasts longer than 1 day.  · You notice a bad smell coming from the wounds or dressing.  · Your wound edges break open after stitches (sutures) have been removed.  · You notice increasing pain in the shoulders (shoulder strap areas) or near your shoulder blades.  · You develop dizzy episodes or fainting while standing.  · You develop shortness of breath.  · You develop persistent nausea or vomiting.  · You cannot control your bowel functions or lose your appetite.  · You develop diarrhea.  SEEK IMMEDIATE MEDICAL CARE IF:   · You have a fever.  · You develop a rash.  · You have difficulty breathing or sharp pains in your chest.  · You develop any reaction or side effects to medicines given.  MAKE SURE YOU:  · Understand these instructions.  · Will watch your condition.  · Will get help right away if you are not doing well or get worse.     This information is not intended to replace advice given to you by your health care provider. Make sure you discuss any questions you have with your health care provider.     Document Released: 12/18/2006 Document Revised: 05/03/2016 Document Reviewed: 06/26/2012  [employer] Interactive Patient Education ©2016 [employer]      DIET: To avoid nausea, slowly advance diet as tolerated, avoiding spicy or greasy foods for the first day.  Add more substantial food to your diet according to your physician's instructions.  Babies can be fed formula or breast milk as soon as they are hungry.  INCREASE FLUIDS AND FIBER TO AVOID CONSTIPATION.    SURGICAL DRESSING/BATHING: Ok to shower tomorrow 10/18, no submerging in water until follow up appointment    FOLLOW-UP APPOINTMENT:  A follow-up appointment should be arranged with your doctor in 1-2 weeks; call to schedule.    You should CALL YOUR PHYSICIAN if you develop:  Fever greater than 101 degrees F.  Pain not relieved by medication, or persistent nausea or vomiting.  Excessive bleeding (blood soaking through  dressing) or unexpected drainage from the wound.  Extreme redness or swelling around the incision site, drainage of pus or foul smelling drainage.  Inability to urinate or empty your bladder within 8 hours.  Problems with breathing or chest pain.    You should call 911 if you develop problems with breathing or chest pain.  If you are unable to contact your doctor or surgical center, you should go to the nearest emergency room or urgent care center.  Physician's (Dr. Lee) telephone #: [phone]    If any questions arise, call your doctor.  If your doctor is not available, please feel free to call the Surgical Center at [phone].  The Center is open Monday through Friday from 7AM to 7PM.  You can also call the [employer] HOTLINE open 24 hours/day, 7 days/week and speak to a nurse at [phone], or toll free at [phone].    A registered nurse may call you a few days after your surgery to see how you are doing after your procedure.    MEDICATIONS: Resume taking daily medication.  Take prescribed pain medication with food.  If no medication is prescribed, you may take non-aspirin pain medication if needed.  PAIN MEDICATION CAN BE VERY CONSTIPATING.  Take a stool softener or laxative such as senokot, pericolace, or milk of magnesia if needed.    Prescription given for norco (pain).  Last pain medication given at 8:13PM (percocet).    If your physician has prescribed pain medication that includes Acetaminophen (Tylenol), do not take additional Acetaminophen (Tylenol) while taking the prescribed medication.    Depression / Suicide Risk    As you are discharged from this [facility] facility, it is important to learn how to keep safe from harming yourself.    Recognize the warning signs:  · Abrupt changes in personality, positive or negative- including increase in energy   · Giving away possessions  · Change in eating patterns- significant weight changes-  positive or negative  · Change in sleeping  patterns- unable to sleep or sleeping all the time   · Unwillingness or inability to communicate  · Depression  · Unusual sadness, discouragement and loneliness  · Talk of wanting to die  · Neglect of personal appearance   · Rebelliousness- reckless behavior  · Withdrawal from people/activities they love  · Confusion- inability to concentrate     If you or a loved one observes any of these behaviors or has concerns about self-harm, here's what you can do:  · Talk about it- your feelings and reasons for harming yourself  · Remove any means that you might use to hurt yourself (examples: pills, rope, extension cords, firearm)  · Get professional help from the community (Mental Health, Substance Abuse, psychological counseling)  · Do not be alone:Call your Safe Contact- someone whom you trust who will be there for you.  · Call your local CRISIS HOTLINE 641-5539 or 129-375-4318  · Call your local Children's Mobile Crisis Response Team [place] (369) 359-8714 or www.[employer]  · Call the toll free National Suicide Prevention Hotlines   · National Suicide Prevention Lifeline 875-379-CBLH (2480)  · National Hope Line Network 800-SUICIDE (383-5410)

## 2018-10-18 NOTE — OR NURSING
Pt VSS, pain controlled, tolerating po intake. Pt and family given discharge education/instructions, all questions answered. IV discontinued, site wnl. Surgical site wnl. Pt discharged home in stable condition with script and all belongings.

## 2018-10-18 NOTE — ED NOTES
Pt back from CT. Reports improvement in pain. Aware that he is NPO at this time. Denies further needs at this time.

## 2018-10-18 NOTE — H&P
DATE OF ADMISSION:  10/17/2018    IDENTIFICATION:  A 23-year-old male.    HISTORY OF PRESENT ILLNESS:  Patient was in his usual state of health until   this morning when he started feeling poorly and started having periumbilical   to right lower quadrant pain.  He has had some nausea as well.  He has had no   fevers, brought to emergency room, was found to have a white count of 11,000   and a CT scan showing early appendicitis.  I have been asked to see him in   regards to this.    PAST MEDICAL HISTORY:  Illnesses are asthma.    PAST SURGICAL HISTORY:  None.    MEDICATIONS:  Albuterol.    ALLERGIES:  None.    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    REVIEW OF SYSTEMS:  Otherwise unremarkable.    PHYSICAL EXAMINATION:  VITAL SIGNS:  He is afebrile.  GENERAL:  Alert and cooperative.  HEENT:  Pupils are 3 mm.  Anicteric.  NECK:  Supple.  ABDOMEN:  Soft with tenderness in the right lower quadrant.  He has some mild   Rovsing sign.  EXTREMITIES:  Without edema or deformity.  NEUROLOGIC:  Age appropriate.    IMPRESSION:  A 23-year-old male with acute appendicitis.    PLAN:  For laparoscopic appendectomy.  Procedure was described to the patient   as well as the risks including bleeding, infection, conversion to an open   procedure and anesthetic risk.  He understands and wished to proceed.       ____________________________________     VALENTINO HAMMOND MD    [facility] / [facility]    DD:  10/17/2018 19:21:35  DT:  10/17/2018 20:32:56    D#:  [id number]  Job#:  [id number]

## 2019-12-03 ENCOUNTER — APPOINTMENT (OUTPATIENT)
Dept: RADIOLOGY | Facility: MEDICAL CENTER | Age: 24
End: 2019-12-03
Attending: ORTHOPAEDIC SURGERY
Payer: COMMERCIAL

## 2019-12-03 ENCOUNTER — HOSPITAL ENCOUNTER (OUTPATIENT)
Facility: MEDICAL CENTER | Age: 24
End: 2019-12-03
Attending: ORTHOPAEDIC SURGERY | Admitting: ORTHOPAEDIC SURGERY
Payer: COMMERCIAL

## 2019-12-03 ENCOUNTER — ANESTHESIA EVENT (OUTPATIENT)
Dept: SURGERY | Facility: MEDICAL CENTER | Age: 24
End: 2019-12-03
Payer: COMMERCIAL

## 2019-12-03 ENCOUNTER — ANESTHESIA (OUTPATIENT)
Dept: SURGERY | Facility: MEDICAL CENTER | Age: 24
End: 2019-12-03
Payer: COMMERCIAL

## 2019-12-03 VITALS
HEART RATE: 63 BPM | RESPIRATION RATE: 16 BRPM | TEMPERATURE: 97.7 F | SYSTOLIC BLOOD PRESSURE: 129 MMHG | OXYGEN SATURATION: 96 % | DIASTOLIC BLOOD PRESSURE: 71 MMHG | HEIGHT: 74 IN | BODY MASS INDEX: 24.73 KG/M2 | WEIGHT: 192.68 LBS

## 2019-12-03 PROBLEM — J45.909 ASTHMA: Status: ACTIVE | Noted: 2019-12-03

## 2019-12-03 PROCEDURE — 73600 X-RAY EXAM OF ANKLE: CPT | Mod: LT

## 2019-12-03 PROCEDURE — 700105 HCHG RX REV CODE 258: Performed by: ORTHOPAEDIC SURGERY

## 2019-12-03 PROCEDURE — 160041 HCHG SURGERY MINUTES - EA ADDL 1 MIN LEVEL 4: Performed by: ORTHOPAEDIC SURGERY

## 2019-12-03 PROCEDURE — 160022 HCHG BLOCK: Performed by: ORTHOPAEDIC SURGERY

## 2019-12-03 PROCEDURE — 160025 RECOVERY II MINUTES (STATS): Performed by: ORTHOPAEDIC SURGERY

## 2019-12-03 PROCEDURE — 700111 HCHG RX REV CODE 636 W/ 250 OVERRIDE (IP)

## 2019-12-03 PROCEDURE — 160046 HCHG PACU - 1ST 60 MINS PHASE II: Performed by: ORTHOPAEDIC SURGERY

## 2019-12-03 PROCEDURE — 160035 HCHG PACU - 1ST 60 MINS PHASE I: Performed by: ORTHOPAEDIC SURGERY

## 2019-12-03 PROCEDURE — 160029 HCHG SURGERY MINUTES - 1ST 30 MINS LEVEL 4: Performed by: ORTHOPAEDIC SURGERY

## 2019-12-03 PROCEDURE — 160048 HCHG OR STATISTICAL LEVEL 1-5: Performed by: ORTHOPAEDIC SURGERY

## 2019-12-03 PROCEDURE — 160002 HCHG RECOVERY MINUTES (STAT): Performed by: ORTHOPAEDIC SURGERY

## 2019-12-03 PROCEDURE — 700101 HCHG RX REV CODE 250: Performed by: ORTHOPAEDIC SURGERY

## 2019-12-03 PROCEDURE — C1713 ANCHOR/SCREW BN/BN,TIS/BN: HCPCS | Performed by: ORTHOPAEDIC SURGERY

## 2019-12-03 PROCEDURE — 700101 HCHG RX REV CODE 250: Performed by: ANESTHESIOLOGY

## 2019-12-03 PROCEDURE — 500440 HCHG DRESSING, STERILE ROLL (KERLIX): Performed by: ORTHOPAEDIC SURGERY

## 2019-12-03 PROCEDURE — 160009 HCHG ANES TIME/MIN: Performed by: ORTHOPAEDIC SURGERY

## 2019-12-03 PROCEDURE — A9270 NON-COVERED ITEM OR SERVICE: HCPCS | Performed by: ANESTHESIOLOGY

## 2019-12-03 PROCEDURE — 700111 HCHG RX REV CODE 636 W/ 250 OVERRIDE (IP): Performed by: ANESTHESIOLOGY

## 2019-12-03 PROCEDURE — 700102 HCHG RX REV CODE 250 W/ 637 OVERRIDE(OP): Performed by: ANESTHESIOLOGY

## 2019-12-03 PROCEDURE — 501838 HCHG SUTURE GENERAL: Performed by: ORTHOPAEDIC SURGERY

## 2019-12-03 PROCEDURE — 700111 HCHG RX REV CODE 636 W/ 250 OVERRIDE (IP): Performed by: ORTHOPAEDIC SURGERY

## 2019-12-03 PROCEDURE — 160047 HCHG PACU  - EA ADDL 30 MINS PHASE II: Performed by: ORTHOPAEDIC SURGERY

## 2019-12-03 PROCEDURE — 500881 HCHG PACK, EXTREMITY: Performed by: ORTHOPAEDIC SURGERY

## 2019-12-03 DEVICE — SCREW CORT 3.5X42MM ST HEX - (4SFLX6+MDFTX3=27): Type: IMPLANTABLE DEVICE | Site: ANKLE | Status: FUNCTIONAL

## 2019-12-03 DEVICE — IMPLANTABLE DEVICE: Type: IMPLANTABLE DEVICE | Site: ANKLE | Status: FUNCTIONAL

## 2019-12-03 DEVICE — SCREW CORT 3.5X55MM ST HEX - (4SFLX6+MDFTX3=27)(SDS=4): Type: IMPLANTABLE DEVICE | Site: ANKLE | Status: FUNCTIONAL

## 2019-12-03 DEVICE — SCREW CORT 3.5X34MM ST HEX (4TX6+1TX4+1TX3=31)(SDS=22): Type: IMPLANTABLE DEVICE | Site: ANKLE | Status: FUNCTIONAL

## 2019-12-03 RX ORDER — HALOPERIDOL 5 MG/ML
1 INJECTION INTRAMUSCULAR
Status: DISCONTINUED | OUTPATIENT
Start: 2019-12-03 | End: 2019-12-03 | Stop reason: HOSPADM

## 2019-12-03 RX ORDER — ONDANSETRON 2 MG/ML
INJECTION INTRAMUSCULAR; INTRAVENOUS PRN
Status: DISCONTINUED | OUTPATIENT
Start: 2019-12-03 | End: 2019-12-03 | Stop reason: SURG

## 2019-12-03 RX ORDER — CELECOXIB 200 MG/1
400 CAPSULE ORAL ONCE
Status: COMPLETED | OUTPATIENT
Start: 2019-12-03 | End: 2019-12-03

## 2019-12-03 RX ORDER — KETOROLAC TROMETHAMINE 30 MG/ML
INJECTION, SOLUTION INTRAMUSCULAR; INTRAVENOUS PRN
Status: DISCONTINUED | OUTPATIENT
Start: 2019-12-03 | End: 2019-12-03 | Stop reason: SURG

## 2019-12-03 RX ORDER — LIDOCAINE HYDROCHLORIDE 10 MG/ML
INJECTION, SOLUTION EPIDURAL; INFILTRATION; INTRACAUDAL; PERINEURAL
Status: COMPLETED
Start: 2019-12-03 | End: 2019-12-03

## 2019-12-03 RX ORDER — HYDROMORPHONE HYDROCHLORIDE 1 MG/ML
0.4 INJECTION, SOLUTION INTRAMUSCULAR; INTRAVENOUS; SUBCUTANEOUS
Status: DISCONTINUED | OUTPATIENT
Start: 2019-12-03 | End: 2019-12-03 | Stop reason: HOSPADM

## 2019-12-03 RX ORDER — KETAMINE HYDROCHLORIDE 50 MG/ML
INJECTION, SOLUTION INTRAMUSCULAR; INTRAVENOUS PRN
Status: DISCONTINUED | OUTPATIENT
Start: 2019-12-03 | End: 2019-12-03 | Stop reason: SURG

## 2019-12-03 RX ORDER — MAGNESIUM SULFATE HEPTAHYDRATE 40 MG/ML
INJECTION, SOLUTION INTRAVENOUS PRN
Status: DISCONTINUED | OUTPATIENT
Start: 2019-12-03 | End: 2019-12-03 | Stop reason: SURG

## 2019-12-03 RX ORDER — SODIUM CHLORIDE, SODIUM LACTATE, POTASSIUM CHLORIDE, CALCIUM CHLORIDE 600; 310; 30; 20 MG/100ML; MG/100ML; MG/100ML; MG/100ML
INJECTION, SOLUTION INTRAVENOUS CONTINUOUS
Status: DISCONTINUED | OUTPATIENT
Start: 2019-12-03 | End: 2019-12-03 | Stop reason: HOSPADM

## 2019-12-03 RX ORDER — HYDROMORPHONE HYDROCHLORIDE 1 MG/ML
0.2 INJECTION, SOLUTION INTRAMUSCULAR; INTRAVENOUS; SUBCUTANEOUS
Status: DISCONTINUED | OUTPATIENT
Start: 2019-12-03 | End: 2019-12-03 | Stop reason: HOSPADM

## 2019-12-03 RX ORDER — DEXAMETHASONE SODIUM PHOSPHATE 4 MG/ML
INJECTION, SOLUTION INTRA-ARTICULAR; INTRALESIONAL; INTRAMUSCULAR; INTRAVENOUS; SOFT TISSUE PRN
Status: DISCONTINUED | OUTPATIENT
Start: 2019-12-03 | End: 2019-12-03 | Stop reason: SURG

## 2019-12-03 RX ORDER — CEFAZOLIN SODIUM 1 G/3ML
INJECTION, POWDER, FOR SOLUTION INTRAMUSCULAR; INTRAVENOUS PRN
Status: DISCONTINUED | OUTPATIENT
Start: 2019-12-03 | End: 2019-12-03 | Stop reason: SURG

## 2019-12-03 RX ORDER — OXYCODONE HCL 5 MG/5 ML
10 SOLUTION, ORAL ORAL
Status: DISCONTINUED | OUTPATIENT
Start: 2019-12-03 | End: 2019-12-03 | Stop reason: HOSPADM

## 2019-12-03 RX ORDER — HYDRALAZINE HYDROCHLORIDE 20 MG/ML
5 INJECTION INTRAMUSCULAR; INTRAVENOUS
Status: DISCONTINUED | OUTPATIENT
Start: 2019-12-03 | End: 2019-12-03 | Stop reason: HOSPADM

## 2019-12-03 RX ORDER — ACETAMINOPHEN 325 MG/1
975 TABLET ORAL EVERY 4 HOURS PRN
COMMUNITY

## 2019-12-03 RX ORDER — BACITRACIN 50000 [IU]/1
INJECTION, POWDER, FOR SOLUTION INTRAMUSCULAR
Status: DISCONTINUED | OUTPATIENT
Start: 2019-12-03 | End: 2019-12-03 | Stop reason: HOSPADM

## 2019-12-03 RX ORDER — ACETAMINOPHEN 500 MG
1000 TABLET ORAL ONCE
Status: COMPLETED | OUTPATIENT
Start: 2019-12-03 | End: 2019-12-03

## 2019-12-03 RX ORDER — LABETALOL HYDROCHLORIDE 5 MG/ML
5 INJECTION, SOLUTION INTRAVENOUS
Status: DISCONTINUED | OUTPATIENT
Start: 2019-12-03 | End: 2019-12-03 | Stop reason: HOSPADM

## 2019-12-03 RX ORDER — GABAPENTIN 300 MG/1
300 CAPSULE ORAL ONCE
Status: COMPLETED | OUTPATIENT
Start: 2019-12-03 | End: 2019-12-03

## 2019-12-03 RX ORDER — OXYCODONE HCL 5 MG/5 ML
5 SOLUTION, ORAL ORAL
Status: DISCONTINUED | OUTPATIENT
Start: 2019-12-03 | End: 2019-12-03 | Stop reason: HOSPADM

## 2019-12-03 RX ORDER — MIDAZOLAM HYDROCHLORIDE 1 MG/ML
1 INJECTION INTRAMUSCULAR; INTRAVENOUS
Status: DISCONTINUED | OUTPATIENT
Start: 2019-12-03 | End: 2019-12-03 | Stop reason: HOSPADM

## 2019-12-03 RX ORDER — MIDAZOLAM HYDROCHLORIDE 1 MG/ML
INJECTION INTRAMUSCULAR; INTRAVENOUS PRN
Status: DISCONTINUED | OUTPATIENT
Start: 2019-12-03 | End: 2019-12-03 | Stop reason: SURG

## 2019-12-03 RX ORDER — BUPIVACAINE HYDROCHLORIDE 5 MG/ML
INJECTION, SOLUTION PERINEURAL
Status: DISCONTINUED | OUTPATIENT
Start: 2019-12-03 | End: 2019-12-03 | Stop reason: HOSPADM

## 2019-12-03 RX ORDER — ONDANSETRON 2 MG/ML
4 INJECTION INTRAMUSCULAR; INTRAVENOUS
Status: DISCONTINUED | OUTPATIENT
Start: 2019-12-03 | End: 2019-12-03 | Stop reason: HOSPADM

## 2019-12-03 RX ORDER — DIPHENHYDRAMINE HYDROCHLORIDE 50 MG/ML
12.5 INJECTION INTRAMUSCULAR; INTRAVENOUS
Status: DISCONTINUED | OUTPATIENT
Start: 2019-12-03 | End: 2019-12-03 | Stop reason: HOSPADM

## 2019-12-03 RX ORDER — HYDROMORPHONE HYDROCHLORIDE 1 MG/ML
0.1 INJECTION, SOLUTION INTRAMUSCULAR; INTRAVENOUS; SUBCUTANEOUS
Status: DISCONTINUED | OUTPATIENT
Start: 2019-12-03 | End: 2019-12-03 | Stop reason: HOSPADM

## 2019-12-03 RX ADMIN — MIDAZOLAM 2 MG: 1 INJECTION INTRAMUSCULAR; INTRAVENOUS at 10:20

## 2019-12-03 RX ADMIN — PROPOFOL 200 MG: 10 INJECTION, EMULSION INTRAVENOUS at 10:25

## 2019-12-03 RX ADMIN — FENTANYL CITRATE 50 MCG: 50 INJECTION, SOLUTION INTRAMUSCULAR; INTRAVENOUS at 11:10

## 2019-12-03 RX ADMIN — Medication 0.5 ML: at 09:46

## 2019-12-03 RX ADMIN — FENTANYL CITRATE 50 MCG: 50 INJECTION, SOLUTION INTRAMUSCULAR; INTRAVENOUS at 11:19

## 2019-12-03 RX ADMIN — CEFAZOLIN 2 G: 330 INJECTION, POWDER, FOR SOLUTION INTRAMUSCULAR; INTRAVENOUS at 10:30

## 2019-12-03 RX ADMIN — ACETAMINOPHEN 1000 MG: 500 TABLET ORAL at 09:47

## 2019-12-03 RX ADMIN — SODIUM CHLORIDE, POTASSIUM CHLORIDE, SODIUM LACTATE AND CALCIUM CHLORIDE: 600; 310; 30; 20 INJECTION, SOLUTION INTRAVENOUS at 10:20

## 2019-12-03 RX ADMIN — LIDOCAINE HYDROCHLORIDE 0.5 ML: 10 INJECTION, SOLUTION EPIDURAL; INFILTRATION; INTRACAUDAL at 09:46

## 2019-12-03 RX ADMIN — MAGNESIUM SULFATE IN WATER 4 G: 40 INJECTION, SOLUTION INTRAVENOUS at 10:30

## 2019-12-03 RX ADMIN — SODIUM CHLORIDE, POTASSIUM CHLORIDE, SODIUM LACTATE AND CALCIUM CHLORIDE: 600; 310; 30; 20 INJECTION, SOLUTION INTRAVENOUS at 09:46

## 2019-12-03 RX ADMIN — CELECOXIB 400 MG: 200 CAPSULE ORAL at 09:47

## 2019-12-03 RX ADMIN — KETAMINE HYDROCHLORIDE 75 MG: 50 INJECTION, SOLUTION INTRAMUSCULAR; INTRAVENOUS at 10:30

## 2019-12-03 RX ADMIN — ONDANSETRON 4 MG: 2 INJECTION INTRAMUSCULAR; INTRAVENOUS at 11:10

## 2019-12-03 RX ADMIN — GABAPENTIN 300 MG: 300 CAPSULE ORAL at 09:47

## 2019-12-03 RX ADMIN — DEXAMETHASONE SODIUM PHOSPHATE 10 MG: 4 INJECTION, SOLUTION INTRA-ARTICULAR; INTRALESIONAL; INTRAMUSCULAR; INTRAVENOUS; SOFT TISSUE at 10:30

## 2019-12-03 RX ADMIN — KETOROLAC TROMETHAMINE 15 MG: 30 INJECTION, SOLUTION INTRAMUSCULAR at 11:10

## 2019-12-03 NOTE — OP REPORT
DATE OF SERVICE:  12/03/2019    PREOPERATIVE DIAGNOSIS:  Left ankle medial malleolus fracture.    POSTOPERATIVE DIAGNOSIS:  Left ankle medial malleolus fracture.    PROCEDURE PERFORMED:  Open reduction and internal fixation, vertical medial   malleolus fracture.    SURGEON:  Armen Oneal MD    ASSISTANT:  Mary Carrion, certified first assist    ANESTHESIA:  General plus local.    ANESTHESIOLOGIST:  Nakia Nicholson MD    BLOOD LOSS:  Minimal.    TOURNIQUET TIME:  23 minutes.    COMPLICATIONS:  None apparent.    DISPOSITION:  PACU.    CONDITION:  Stable.    INDICATIONS:  The patient is a 24-year-old male, plays football for the   [employer], suffered a vertical medial malleolus fracture of the   left ankle playing football this past week.  I discussed with the patient the   risks, benefits, rationale of surgery, alternatives.  Risks including but not   limited to infection, neurovascular injury, incomplete relief of symptoms,   malunion, nonunion, symptomatic hardware, need for further surgery,   posttraumatic osteoarthritis, DVT, PE, inability to return to pre-injury   state, complications of anesthesia.  Informed consent was signed and placed on   the chart.  All of his questions were answered; no guarantees implied or   given.    TECHNIQUE:  Both patient and I agreed to the correct upper extremity.  The   left leg was signed and marked in preoperative holding.  He was given 2 g IV   Ancef prophylaxis.  He was taken to the operative suite.  After adequate   anesthesia, time-out was taken by everyone in the room to identify the correct   patient, limb and procedure.  The left leg was sterilely prepped and draped   in standard fashion.  A thigh high tourniquet was placed.  Limb was   exsanguinated, tourniquet inflated to 250 mmHg pressure.  A vertical incision   was made over the medial malleolus.  Skin was incised.  Care was taken to   avoid the saphenous nerve and vein.  Blunt dissection  was carried out.    Periosteum was incised.  Fracture was visualized.  Small amount of periosteum   was cleared from the fracture fragment.  It was irrigated.  A 1/3rd tubular   5-hole locking plate was used in an antiglide type fashion.  A 2.5 drill was   placed at the apex of the fracture bicortically.  A 3.5 cortical screw was   then placed.  The plate was used to reduce the fracture, which reduced in   anatomic reduction.  A second bicortical screw was placed in the first hole on   the plate and a lag screw was placed through the plate and the fifth hole to   get more purchase across the fracture.  This resulted in more compression of   the fracture fragment.  Final x-ray showed anatomic reduction.  Tourniquet was   let down.  Hemostasis was observed.  The wound was copiously irrigated.  A 0   Vicryl was used to close the periosteum over the plate, followed by 2-0 Vicryl   for subcutaneous tissue, 3-0 nylon for the skin.  A 0.5% Marcaine plain,   which had been injected before the case, more was injected around the wound.    Antibiotic ointment and Xeroform was placed.  He was placed in a posterior   splint with side support.  Toes were pink and warm with brisk capillary   refill.  He was transferred to recovery in stable condition.  Counts were   correct.  No apparent complications.    Mary Carrion, certified first assist, was essential for successful   completion of the case.  It could not have been done without her.  In   recovery, the patient is able to dorsiflex, plantar flex his toes.  Toes were   warm and intact to light sensory touch.       ____________________________________     MD ASHLEY Sharma / [facility]    DD:  12/03/2019 11:47:03  DT:  12/03/2019 11:54:23    D#:  [id number]  Job#:  [id number]
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
## 2019-12-03 NOTE — ANESTHESIA TIME REPORT
Anesthesia Start and Stop Event Times     Date Time Event    12/3/2019 1000 Ready for Procedure     1020 Anesthesia Start     1137 Anesthesia Stop        Responsible Staff  12/03/19    Name Role Begin End    Nakia Nicholson M.D. Anesth 1020 1137        Preop Diagnosis (Free Text):  Pre-op Diagnosis     CLOSED FRACTURE OF MEDIAL MALLEOLUS LEFT        Preop Diagnosis (Codes):    Post op Diagnosis  Ankle fracture      Premium Reason  Non-Premium    Comments:

## 2019-12-03 NOTE — ANESTHESIA PROCEDURE NOTES
Airway  Date/Time: 12/3/2019 10:26 AM  Performed by: Nakia Nicholson M.D.  Authorized by: Nakia Nicholson M.D.     Location:  OR  Urgency:  Elective  Indications for Airway Management:  Anesthesia  Spontaneous Ventilation: absent    Sedation Level:  Deep  Preoxygenated: Yes    Mask Difficulty Assessment:  0 - not attempted  Final Airway Type:  Supraglottic airway  Final Supraglottic Airway:  Standard LMA  SGA Size:  4  Airway Seal Pressure (cm H2O):  18  Number of Attempts at Approach:  1

## 2019-12-03 NOTE — DISCHARGE INSTRUCTIONS
ACTIVITY: Rest and take it easy for the first 24 hours.  A responsible adult is recommended to remain with you during that time.  It is normal to feel sleepy.  We encourage you to not do anything that requires balance, judgment or coordination.    MILD FLU-LIKE SYMPTOMS ARE NORMAL. YOU MAY EXPERIENCE GENERALIZED MUSCLE ACHES, THROAT IRRITATION, HEADACHE AND/OR SOME NAUSEA.    FOR 24 HOURS DO NOT:  Drive, operate machinery or run household appliances.  Drink beer or alcoholic beverages.   Make important decisions or sign legal documents.    SPECIAL INSTRUCTIONS: Follow MD instructions.     Left Lower Extremity - ice ,elevate, Non Weight Bearing     DIET: To avoid nausea, slowly advance diet as tolerated, avoiding spicy or greasy foods for the first day.  Add more substantial food to your diet according to your physician's instructions.  Babies can be fed formula or breast milk as soon as they are hungry.  INCREASE FLUIDS AND FIBER TO AVOID CONSTIPATION.    SURGICAL DRESSING/BATHING: Keep dressing clean and dry    FOLLOW-UP APPOINTMENT:  A follow-up appointment should be arranged with your doctor in 1-2 weeks; call to schedule.    You should CALL YOUR PHYSICIAN if you develop:  Fever greater than 101 degrees F.  Pain not relieved by medication, or persistent nausea or vomiting.  Excessive bleeding (blood soaking through dressing) or unexpected drainage from the wound.  Extreme redness or swelling around the incision site, drainage of pus or foul smelling drainage.  Inability to urinate or empty your bladder within 8 hours.  Problems with breathing or chest pain.    You should call 911 if you develop problems with breathing or chest pain.  If you are unable to contact your doctor or surgical center, you should go to the nearest emergency room or urgent care center.  Physician's telephone #: [phone] Dr nOeal    If any questions arise, call your doctor.  If your doctor is not available, please feel free to call  the Surgical Center at [phone].  The Center is open Monday through Friday from 7AM to 7PM.  You can also call the HEALTH HOTLINE open 24 hours/day, 7 days/week and speak to a nurse at [phone], or toll free at [phone].    A registered nurse may call you a few days after your surgery to see how you are doing after your procedure.    MEDICATIONS: Resume taking daily medication.  Take prescribed pain medication with food.  If no medication is prescribed, you may take non-aspirin pain medication if needed.  PAIN MEDICATION CAN BE VERY CONSTIPATING.  Take a stool softener or laxative such as senokot, pericolace, or milk of magnesia if needed.    Prescription with family. No pain medication given In recovery.    If your physician has prescribed pain medication that includes Acetaminophen (Tylenol), do not take additional Acetaminophen (Tylenol) while taking the prescribed medication.    Depression / Suicide Risk    As you are discharged from this [facility] facility, it is important to learn how to keep safe from harming yourself.    Recognize the warning signs:  · Abrupt changes in personality, positive or negative- including increase in energy   · Giving away possessions  · Change in eating patterns- significant weight changes-  positive or negative  · Change in sleeping patterns- unable to sleep or sleeping all the time   · Unwillingness or inability to communicate  · Depression  · Unusual sadness, discouragement and loneliness  · Talk of wanting to die  · Neglect of personal appearance   · Rebelliousness- reckless behavior  · Withdrawal from people/activities they love  · Confusion- inability to concentrate     If you or a loved one observes any of these behaviors or has concerns about self-harm, here's what you can do:  · Talk about it- your feelings and reasons for harming yourself  · Remove any means that you might use to hurt yourself (examples: pills, rope, extension cords, firearm)  · Get  professional help from the community (Mental Health, Substance Abuse, psychological counseling)  · Do not be alone:Call your Safe Contact- someone whom you trust who will be there for you.  · Call your local CRISIS HOTLINE 829-0546 or 080-810-6293  · Call your local Children's Mobile Crisis Response Team [place] (736) 969-8591 or www.[employer]  · Call the toll free National Suicide Prevention Hotlines   · National Suicide Prevention Lifeline 630-282-BGCU (4586)  · National Hope Line Network 800-SUICIDE (985-1029)

## 2019-12-03 NOTE — OR SURGEON
Immediate Post OP Note    PreOp Diagnosis: left medial malleolous fracture     PostOp Diagnosis: same     Procedure(s):  ORIF, ANKLE - MEDIAL MELLEOLUS - Wound Class: Clean    Surgeon(s):  Armen Oneal M.D.    Anesthesiologist/Type of Anesthesia:  Anesthesiologist: Nakia Nicholson M.D./General    Surgical Staff:  Circulator: Heather C Cogan, R.N.; Rayray Pate R.N.  Scrub Person: Cindy Patterson  First Assist: Mary Carrion CFA  Radiology Technologist: Digna Trivedi    Specimens removed if any:  * No specimens in log *    Estimated Blood Loss: minimal     Findings: displaced vertical medial malleolous fracture     Complications: no apparent         12/3/2019 11:41 AM Armen Oneal M.D.

## 2019-12-03 NOTE — OR NURSING
Vss. Pt denies pain or nausea. Tolerating po fluids. CSM intact to L foot. Dressing and splint are clean dry and intact. Family updated via text message system.

## 2019-12-03 NOTE — ANESTHESIA PREPROCEDURE EVALUATION
Relevant Problems   PULMONARY   (+) Asthma       Physical Exam    Airway   Mallampati: I  TM distance: >3 FB  Neck ROM: full       Cardiovascular - normal exam  Rhythm: regular  Rate: normal  (-) murmur     Dental - normal exam         Pulmonary - normal exam  Breath sounds clear to auscultation     Abdominal    Neurological - normal exam                 Anesthesia Plan    ASA 2       Plan - general and peripheral nerve block     Peripheral nerve block will be post-op pain control  Airway plan will be LMA        Induction: intravenous    Postoperative Plan: Postoperative administration of opioids is intended.    Pertinent diagnostic labs and testing reviewed    Informed Consent:    Anesthetic plan and risks discussed with patient.    Use of blood products discussed with: patient whom consented to blood products.

## 2020-11-13 ENCOUNTER — NON-PROVIDER VISIT (OUTPATIENT)
Dept: URGENT CARE | Facility: CLINIC | Age: 25
End: 2020-11-13

## 2020-11-13 DIAGNOSIS — Z02.1 PRE-EMPLOYMENT DRUG SCREENING: ICD-10-CM

## 2020-11-13 LAB
AMP AMPHETAMINE: NORMAL
COC COCAINE: NORMAL
INT CON NEG: NEGATIVE
INT CON POS: POSITIVE
MET METHAMPHETAMINES: NORMAL
OPI OPIATES: NORMAL
PCP PHENCYCLIDINE: NORMAL
POC DRUG COMMENT 753798-OCCUPATIONAL HEALTH: NORMAL
THC: NORMAL

## 2020-11-13 PROCEDURE — 80305 DRUG TEST PRSMV DIR OPT OBS: CPT | Performed by: PHYSICIAN ASSISTANT

## (undated) DEVICE — GLOVE BIOGEL PI INDICATOR SZ 7.0 SURGICAL PF LF - (50/BX 4BX/CA)

## (undated) DEVICE — ELECTRODE DUAL RETURN W/ CORD - (50/PK)

## (undated) DEVICE — GLOVE SZ 6.5 BIOGEL PI MICRO - PF LF (50PR/BX)

## (undated) DEVICE — GLOVE BIOGEL INDICATOR SZ 8 SURGICAL PF LTX - (50/BX 4BX/CA)

## (undated) DEVICE — SENSOR SPO2 NEO LNCS ADHESIVE (20/BX) SEE USER NOTES

## (undated) DEVICE — PROTECTOR ULNA NERVE - (36PR/CA)

## (undated) DEVICE — SET EXTENSION WITH 2 PORTS (48EA/CA) ***PART #2C8610 IS A SUBSTITUTE*****

## (undated) DEVICE — SPLINT PLASTER 5 IN X 30 IN - (50EA/BX 6BX/CA)

## (undated) DEVICE — KIT ANESTHESIA W/CIRCUIT & 3/LT BAG W/FILTER (20EA/CA)

## (undated) DEVICE — SET LEADWIRE 5 LEAD BEDSIDE DISPOSABLE ECG (1SET OF 5/EA)

## (undated) DEVICE — SUCTION INSTRUMENT YANKAUER BULBOUS TIP W/O VENT (50EA/CA)

## (undated) DEVICE — SUTURE GENERAL

## (undated) DEVICE — CLOSURE SKIN STRIP 1/4 X 3 IN - (STERI STRIP) (50/BX)

## (undated) DEVICE — SUTURE 3-0 ETHILON FS-1 - (36/BX) 30 INCH

## (undated) DEVICE — TOWELS CLOTH SURGICAL - (4/PK 20PK/CA)

## (undated) DEVICE — GLOVE SZ 7 BIOGEL PI MICRO - PF LF (50PR/BX 4BX/CA)

## (undated) DEVICE — ELECTRODE 850 FOAM ADHESIVE - HYDROGEL RADIOTRNSPRNT (50/PK)

## (undated) DEVICE — LACTATED RINGERS INJ 1000 ML - (14EA/CA 60CA/PF)

## (undated) DEVICE — STERI STRIP COMPOUND BENZOIN - TINCTURE 0.6ML WITH APPLICATOR (40EA/BX)

## (undated) DEVICE — KIT ROOM DECONTAMINATION

## (undated) DEVICE — MASK ANESTHESIA ADULT  - (100/CA)

## (undated) DEVICE — GLOVE BIOGEL SZ 6.5 SURGICAL PF LTX (50PR/BX 4BX/CA)

## (undated) DEVICE — SLEEVE, VASO, THIGH, MED

## (undated) DEVICE — TROCAR 5X100 BLADED Z-THREAD - KII (6/BX)

## (undated) DEVICE — DRESSING TRANSPARENT FILM TEGADERM 2.375 X 2.75"  (100EA/BX)"

## (undated) DEVICE — CLOSURE WOUND 1/4 X 4 (STERI - STRIP) (50/BX 4BX/CA)

## (undated) DEVICE — GOWN WARMING STANDARD FLEX - (30/CA)

## (undated) DEVICE — STAPLER 45MM ARTICULATING - ENDO (3EA/BX)

## (undated) DEVICE — STAPLE 45MM VASCULAR WHITE 2.5MM (12EA/BX)

## (undated) DEVICE — BANDAGE ELASTIC 6 HONEYCOMB - 6X5YD LF (20/CA)"

## (undated) DEVICE — NEPTUNE 4 PORT MANIFOLD - (20/PK)

## (undated) DEVICE — HEAD HOLDER JUNIOR/ADULT

## (undated) DEVICE — SET SUCTION/IRRIGATION WITH DISPOSABLE TIP (6/CA )PART #0250-070-520 IS A SUB

## (undated) DEVICE — TROCAR Z THREAD 12 X 100 - BLADED (6/BX)

## (undated) DEVICE — SPONGE GAUZESTER. 2X2 4-PL - (2/PK 50PK/BX 30BX/CS)

## (undated) DEVICE — SUTURE 2-0 VICRYL PLUS CT-1 36 (36PK/BX)"

## (undated) DEVICE — TUBE E-T HI-LO CUFF 8.0MM (10EA/PK)

## (undated) DEVICE — CANISTER SUCTION 3000ML MECHANICAL FILTER AUTO SHUTOFF MEDI-VAC NONSTERILE LF DISP  (40EA/CA)

## (undated) DEVICE — NEEDLE INSFL 120MM 14GA VRRS - (20/BX)

## (undated) DEVICE — GLOVE BIOGEL INDICATOR SZ 7.5 SURGICAL PF LTX - (50PR/BX 4BX/CA)

## (undated) DEVICE — PAD PREP 24 X 48 CUFFED - (100/CA)

## (undated) DEVICE — DRILL BIT 2.5 TWIST 310.25 (8TX2+1TX3=19)

## (undated) DEVICE — CHLORAPREP 26 ML APPLICATOR - ORANGE TINT(25/CA)

## (undated) DEVICE — CONTAINER SPECIMEN BAG OR - STERILE 4 OZ W/LID (100EA/CA)

## (undated) DEVICE — DRAPE C-ARM LARGE 41IN X 74 IN - (10/BX 2BX/CA)

## (undated) DEVICE — GLOVE BIOGEL PI ORTHO SZ 7.5 PF LF (40PR/BX)

## (undated) DEVICE — BLADE SURGICAL CLIPPER - (50EA/CA)

## (undated) DEVICE — PACK LAP CHOLE OR - (2EA/CA)

## (undated) DEVICE — GLOVE BIOGEL INDICATOR SZ 6.5 SURGICAL PF LTX - (50PR/BX 4BX/CA)

## (undated) DEVICE — TUBING CLEARLINK DUO-VENT - C-FLO (48EA/CA)

## (undated) DEVICE — GLOVE, BIOGEL ECLIPSE, SZ 7.0, PF LTX (50/BX)

## (undated) DEVICE — PACK LOWER EXTREMITY - (2/CA)

## (undated) DEVICE — CANNULA W/SEAL 5X100 Z-THRE - ADED KII (12/BX)

## (undated) DEVICE — SODIUM CHL IRRIGATION 0.9% 1000ML (12EA/CA)

## (undated) DEVICE — BLOCK

## (undated) DEVICE — DRILL BIT 3.5 TWIST 310.35 (9TX2+1TX1=19)

## (undated) DEVICE — SUTURE 4-0 VICRYL PLUS FS-2 - 27 INCH (36/BX)

## (undated) DEVICE — WATER IRRIG. STER. 1500 ML - (9/CA)

## (undated) DEVICE — BANDAGE ROLL STERILE BULKEE 4.5 IN X 4 YD (100EA/CA)